# Patient Record
Sex: FEMALE | Race: WHITE | Employment: FULL TIME | URBAN - METROPOLITAN AREA
[De-identification: names, ages, dates, MRNs, and addresses within clinical notes are randomized per-mention and may not be internally consistent; named-entity substitution may affect disease eponyms.]

---

## 2019-09-25 ENCOUNTER — OFFICE VISIT (OUTPATIENT)
Dept: ORTHOPEDIC SURGERY | Age: 64
End: 2019-09-25

## 2019-09-25 VITALS
HEART RATE: 73 BPM | TEMPERATURE: 97.3 F | DIASTOLIC BLOOD PRESSURE: 62 MMHG | SYSTOLIC BLOOD PRESSURE: 137 MMHG | OXYGEN SATURATION: 100 % | BODY MASS INDEX: 27.25 KG/M2 | WEIGHT: 201.2 LBS | HEIGHT: 72 IN

## 2019-09-25 DIAGNOSIS — S16.1XXS STRAIN OF NECK MUSCLE, SEQUELA: ICD-10-CM

## 2019-09-25 DIAGNOSIS — M50.30 DDD (DEGENERATIVE DISC DISEASE), CERVICAL: ICD-10-CM

## 2019-09-25 DIAGNOSIS — M54.2 NECK PAIN: Primary | ICD-10-CM

## 2019-09-25 RX ORDER — METHYLPREDNISOLONE 4 MG/1
TABLET ORAL
Qty: 1 DOSE PACK | Refills: 0 | Status: SHIPPED | OUTPATIENT
Start: 2019-09-25

## 2019-09-25 RX ORDER — OMEPRAZOLE 20 MG/1
20 CAPSULE, DELAYED RELEASE ORAL DAILY
COMMUNITY

## 2019-09-25 RX ORDER — NAPROXEN 500 MG/1
500 TABLET ORAL 2 TIMES DAILY WITH MEALS
Qty: 30 TAB | Refills: 0 | Status: SHIPPED | OUTPATIENT
Start: 2019-09-25

## 2019-09-25 RX ORDER — BISMUTH SUBSALICYLATE 262 MG
1 TABLET,CHEWABLE ORAL DAILY
COMMUNITY

## 2019-09-25 RX ORDER — ERGOCALCIFEROL 1.25 MG/1
50000 CAPSULE ORAL
COMMUNITY

## 2019-09-25 RX ORDER — RANITIDINE 300 MG/1
TABLET ORAL 2 TIMES DAILY
COMMUNITY

## 2019-09-25 NOTE — PROGRESS NOTES
[facility]  [address], [address]  Phone: [phone]  Fax: [phone]        INITIAL CONSULTATION      HISTORY OF PRESENT ILLNESS:  Katerin Lagunas is a 59 y.o. female whom is referred from Dr. Okey Olszewski [facility]) secondary to right-sided neck pain following a fall at work on 4/25/019. She rates her pain 4-8/10. She denies radicular sxs at this time. Pain exacerbated when laying down. She report she fell on tile floor of the bathroom at work due to slipping on oil spill. Indicates she hit her neck on the bathroom stall. She reports she had acute onset of pain. Pt denies h/o cervical surgery or injections. Completed PT (6 sessions) in July with no benefit. She is performing her HEP x3/week. Pt completed MDP with some relief. Pt denies dropping things or loss of balance. Pt denies fever, weight loss, or skin changes. Dr. Okey Olszewski referred her, however I have no records. Cervical spine XR dated 5/1/19 films not available for my review. Per report, No acute pathology identified. Mild degenerative changes. The patient is RHD.  reviewed. Body mass index is 27.29 kg/m². PCP: Mer Cunningham MD    Past Medical History:   Diagnosis Date    Hoarseness of voice     Shortness of breath     at times    Wheezing    OTHE  Past Surgical History:   Procedure Laterality Date    HX HYSTERECTOMY      HX KNEE REPLACEMENT Bilateral     HX OTHER SURGICAL Left     foot surgery    HX OTHER SURGICAL Left     elbow    HX OTHER SURGICAL      rectal fissure    HX SHOULDER ARTHROSCOPY Right     IR ATHERECTOMY TIB/PER LT INIT     R SURGICAL Left     foot surgery    HX OTHER SURGICAL Left     elbow    HX OTHER SURGICAL      rectal fissure    HX SHOULDER ARTHROSCOPY Right     IR ATHERECTOMY TIB/PER LT INIT          Tobacco Use    Smoking status: Never Smoker    Smokeless tobacco: Never Used   Substance Use Topics    Alcohol use: No     Work status:  The patient is unknown. Marital status: The patient is unknown. No Known Allergies         Family History   Problem Relation Age of Onset    Hypertension Father          REVIEW OF SYSTEMS  Constitutional symptoms: Negative  Eyes: Negative  Ears, Nose, Throat, and Mouth: Negative  Cardiovascular: Negative  Respiratory: Negative  Genitourinary: Negative  Integumentary (Skin and/or breast): Negative  Musculoskeletal: Positive for neck pain  Extremities: Negative for edema. Endocrine/Rheumatologic: Negative  Hematologic/Lymphatic: Negative  Allergic/Immunologic: Negative  Psychiatric: Negative       PHYSICAL EXAMINATION  Visit Vitals  /62 (BP 1 Location: Left arm, BP Patient Position: Sitting)   Pulse 73   Temp 97.3 °F (36.3 °C) (Oral)   Ht 6' (1.829 m)   Wt 91.3 kg (201 lb 3.2 oz)   SpO2 100%   BMI 27.29 kg/m²       CONSTITUTIONAL: NAD, A&O x 3  HEART: Regular rate and rhythm  ABDOMEN: Positive bowel sounds, soft, nontender, and nondistended  LUNGS: Clear to auscultation bilaterally. SKIN: Negative for rash. RANGE OF MOTION: The patient has full passive range of motion in all four extremities. SENSATION: Sensation is intact to light touch throughout. MOTOR:   Straight Leg Raise: Negative, bilateral  Ron: Negative, bilateral  Tandem Gait: Neg. Deep tendon reflexes are 0 at the brachioradialis, 1+ at the biceps, and 0 at the triceps. Deep tendon reflexes are 2+ at the knees and 0 at the left ankle, 1+ at the right ankle. Shoulder AB/Flex Elbow Flex Wrist Ext Elbow Ext Wrist Flex Hand Intrin Tone   Right +4/5 +4/5 +4/5 +4/5 +4/5 +4/5 +4/5   Left +4/5 +4/5 +4/5 +4/5 +4/5 +4/5 +4/5              Hip Flex Knee Ext Knee Flex Ankle DF GTE Ankle PF Tone   Right +4/5 +4/5 +4/5 +4/5 +4/5 +4/5 +4/5   Left +4/5 +4/5 +4/5 +4/5 +4/5 +4/5 +4/5     RADIOGRAPHS  Preliminary reading of cervical plain films: Moderate disc space narrowing at C5-6 and C6-7. No acute pathology identified.        These are being sent out for official reading by Dr. Damian Garcia. ASSESSMENT   Diagnoses and all orders for this visit:    1. Neck pain  -     AMB POC XRAY, SPINE, CERVICAL; 2 OR 3  -     methylPREDNISolone (MEDROL DOSEPACK) 4 mg tablet; Per dose pack instructions  -     naproxen (NAPROSYN) 500 mg tablet; Take 1 Tab by mouth two (2) times daily (with meals). -     REFERRAL TO PHYSICAL THERAPY    2. Strain of neck muscle, sequela  -     methylPREDNISolone (MEDROL DOSEPACK) 4 mg tablet; Per dose pack instructions  -     naproxen (NAPROSYN) 500 mg tablet; Take 1 Tab by mouth two (2) times daily (with meals). -     REFERRAL TO PHYSICAL THERAPY    3. DDD (degenerative disc disease), cervical  -     methylPREDNISolone (MEDROL DOSEPACK) 4 mg tablet; Per dose pack instructions  -     naproxen (NAPROSYN) 500 mg tablet; Take 1 Tab by mouth two (2) times daily (with meals). -     REFERRAL TO PHYSICAL THERAPY           IMPRESSIONS/RECOMMENDATIONS:  Patient presents today with c/o right-sided neck pain following a fall at work on 4/25/019. I will have the patient sign a release of medical information to obtain notes from Dr. Sirisha Calderon. I will start her on Medrol Dosepak. I gave her a prescription for Naprosyn following completion of the Medrol Dosepak. I will refer her to physical therapy with an emphasis on HEP. Multiple treatment options were discussed. Patient is neurologically intact. I will see the patient back in 6 week's time or earlier if needed. Written by Angelita Harmon, as dictated by Mami Maldonado MD  I examined the patient, reviewed and agree with the note.

## 2019-12-20 ENCOUNTER — OFFICE VISIT (OUTPATIENT)
Dept: ORTHOPEDIC SURGERY | Age: 64
End: 2019-12-20

## 2019-12-20 VITALS
DIASTOLIC BLOOD PRESSURE: 67 MMHG | RESPIRATION RATE: 16 BRPM | BODY MASS INDEX: 27.09 KG/M2 | HEIGHT: 72 IN | HEART RATE: 92 BPM | SYSTOLIC BLOOD PRESSURE: 106 MMHG | OXYGEN SATURATION: 96 % | WEIGHT: 200 LBS

## 2019-12-20 DIAGNOSIS — S16.1XXS STRAIN OF NECK MUSCLE, SEQUELA: ICD-10-CM

## 2019-12-20 DIAGNOSIS — M50.30 DDD (DEGENERATIVE DISC DISEASE), CERVICAL: ICD-10-CM

## 2019-12-20 DIAGNOSIS — M54.2 NECK PAIN: Primary | ICD-10-CM

## 2019-12-20 NOTE — PROGRESS NOTES
[facility]  [address], [place]   Phone: [phone]  Fax: [phone]        PROGRESS NOTE      HISTORY OF PRESENT ILLNESS:  The patient is a 62 y.o. female and was seen today for follow up of right-sided neck pain following a fall at work on 4/25/019. She denies radicular sxs at this time. Pain exacerbated when laying down. She report she fell on tile floor of the bathroom at work due to slipping on oil spill. Indicates she hit her neck on the bathroom stall. She reports she had acute onset of pain. Pt denies h/o cervical surgery or injections. Completed PT (6 sessions) in July with no benefit. She is performing her HEP x3/week. Pt completed MDP with some relief. Pt denies dropping things or loss of balance. Pt denies fever, weight loss, or skin changes. Dr. Poppy Newton referred her, however I have no records. Cervical spine XR dated 5/1/19 films not available for my review. Per report, No acute pathology identified. Mild degenerative changes. Preliminary reading of cervical plain films: Moderate disc space narrowing at C5-6 and C6-7. No acute pathology identified. At last clinical appointment, patient presented with c/o right-sided neck pain following a fall at work on 4/25/019. I had the patient sign a release of medical information to obtain notes from Dr. Poppy Newton. I started her on Medrol Dosepak. I gave her a prescription for Naprosyn following completion of the Medrol Dosepak. I referred her to physical therapy with an emphasis on HEP. The patient returns today with pain location and distribution remain unchanged. Her pain is exacerbated with cervical extension. she rates her pain 2-8/10, previously 4-8/10. Patient completed PT 12/7/19 with some benefit. She did not tolerate traction. Pt notes they did not try dry needling in PT. She continues to maintain a daily HEP. She found temporary relief with a MDP. Pt denies dropping things or loss of balance.  reviewed. Body mass index is 27.12 kg/m².       PCP: Sirisha Anna MD      Past Medical History:   Diagnosis Date    Hoarseness of voice     Shortness of breath     at times    Wheezing         Social History     Socioeconomic History    Marital status: UNKNOWN     Spouse name: Not on file    Number of children: Not on file    Years of education: Not on file    Highest education level: Not on file   Occupational History    Not on file   Social Needs    Financial resource strain: Not on file    Food insecurity:     Worry: Not on file     Inability: Not on file    Transportation needs:     Medical: Not on file     Non-medical: Not on file   Tobacco Use    Smoking status: Never Smoker    Smokeless tobacco: Never Used   Substance and Sexual Activity    Alcohol use: No    Drug use: No    Sexual activity: Not on file   Lifestyle    Physical activity:     Days per week: Not on file     Minutes per session: Not on file    Stress: Not on file   Relationships    Social connections:     Talks on phone: Not on file     Gets together: Not on file     Attends Yazidism service: Not on file     Active member of club or organization: Not on file     Attends meetings of clubs or organizations: Not on file     Relationship status: Not on file    Intimate partner violence:     Fear of current or ex partner: Not on file     Emotionally abused: Not on file     Physically abused: Not on file     Forced sexual activity: Not on file   Other Topics Concern     Service Not Asked    Blood Transfusions Not Asked    Caffeine Concern Not Asked    Occupational Exposure Not Asked   Colie Maizes Hazards Not Asked    Sleep Concern Not Asked    Stress Concern Not Asked    Weight Concern Not Asked    Special Diet Not Asked    Back Care Not Asked    Exercise Not Asked    Bike Helmet Not Asked   [address] Not Asked    Self-Exams Not Asked   Social History Narrative    Not on file       Current Outpatient Medications   Medication Sig Dispense Refill    multivitamin (ONE A DAY) tablet Take 1 Tab by mouth daily.  omeprazole (PRILOSEC) 20 mg capsule Take 20 mg by mouth daily.  ergocalciferol (VITAMIN D2) 50,000 unit capsule Take 50,000 Units by mouth.  raNITIdine (ZANTAC) 300 mg tab Take  by mouth two (2) times a day.  methylPREDNISolone (MEDROL DOSEPACK) 4 mg tablet Per dose pack instructions 1 Dose Pack 0    naproxen (NAPROSYN) 500 mg tablet Take 1 Tab by mouth two (2) times daily (with meals). 30 Tab 0       No Known Allergies       PHYSICAL EXAMINATION    Visit Vitals  /67   Pulse 92   Resp 16   Ht 6' (1.829 m)   Wt 200 lb (90.7 kg)   SpO2 96%   BMI 27.12 kg/m²       CONSTITUTIONAL: NAD, A&O x 3  SENSATION: Intact to light touch throughout  NEURO: Chris's is negative bilaterally. RANGE OF MOTION: The patient has full passive range of motion in all four extremities. Shoulder AB/Flex Elbow Flex Wrist Ext Elbow Ext Wrist Flex Hand Intrin Tone   Right +4/5 +4/5 +4/5 +4/5 +4/5 +4/5 +4/5   Left +4/5 +4/5 +4/5 +4/5 +4/5 +4/5 +4/5                 ASSESSMENT   Diagnoses and all orders for this visit:    1. Neck pain    2. Strain of neck muscle, sequela    3. DDD (degenerative disc disease), cervical          IMPRESSION AND PLAN:  Given her continued pain despite conservative treatments, I will refer for a cervical spine MRI. I advised patient to bring copies of films to next visit. Patient is not interested in surgical intervention at this time. I recommend she continue to perfume her HEP Patient is neurologically intact. I will see the patient back following MRI or earlier if needed. Written by Ella Qiu, as dictated by Robin Shah MD  I examined the patient, reviewed and agree with the note.

## 2019-12-20 NOTE — LETTER
12/20/19 Patient: Stefano Wolf YOB: 1955 Date of Visit: 12/20/2019 Marie Carreon MD 
[address] [facility] [address] [place] [address] VIA Facsimile: [phone] Dear Marie Carreon MD, Thank you for referring Ms. Stefano Wolf to [address] for evaluation. My notes for this consultation are attached. If you have questions, please do not hesitate to call me. I look forward to following your patient along with you. Sincerely, Sharee Mayberry MD

## 2019-12-26 ENCOUNTER — TELEPHONE (OUTPATIENT)
Dept: ORTHOPEDIC SURGERY | Age: 64
End: 2019-12-26

## 2019-12-26 NOTE — TELEPHONE ENCOUNTER
[employer], Kyara Walton, is trying to see if [facility] paperwork has been completed to schedule her MRI. She said she has a contact \"Adan\" for our office, but I am unsure who would be working on this or even if anyone is in today to help her. Please advise.   [phone]

## 2019-12-30 ENCOUNTER — DOCUMENTATION ONLY (OUTPATIENT)
Dept: ORTHOPEDIC SURGERY | Age: 64
End: 2019-12-30

## 2019-12-30 NOTE — TELEPHONE ENCOUNTER
Order and office notes have been faxed to the W/C adjustor, Veronica Castillo with [address] [place], requesting authorization for MRI C-Spine, these are PENDING approval.

## 2019-12-30 NOTE — PROGRESS NOTES
Order and office notes have been faxed to the W/C adjustor, Farhan Collazo with [address] [place], requesting authorization for MRI C-Spine, these are PENDING approval.

## 2020-01-14 ENCOUNTER — DOCUMENTATION ONLY (OUTPATIENT)
Dept: ORTHOPEDIC SURGERY | Age: 65
End: 2020-01-14

## 2020-02-06 NOTE — PROGRESS NOTES
[facility]  [address], [place]   Phone: [phone]  Fax: [phone]        PROGRESS NOTE      HISTORY OF PRESENT ILLNESS:  The patient is a 62 y.o. female and was seen today for follow up of right-sided neck pain following a fall at work on 4/25/019. She denies radicular sxs at this time. Pain exacerbated when laying down and cervical extension. She report she fell on tile floor of the bathroom at work due to slipping on oil spill. Indicates she hit her neck on the bathroom stall. She reports she had acute onset of pain. Pt denies h/o cervical surgery or injections. Patient completed PT 12/7/19 without dry needling noting some benefit. She did not tolerate traction. She continues to maintain a daily HEP. She found temporary relief with a MDP. Pt denies dropping things or loss of balance. Pt denies fever, weight loss, or skin changes. Dr. Saman Moise referred her, however I have no records. Cervical spine XR dated 5/1/19 films not available for my review. Per report, No acute pathology identified. Mild degenerative changes. Preliminary reading of cervical plain films: Moderate disc space narrowing at C5-6 and C6-7. No acute pathology identified.  At last clinical appointment, patient presented with c/o right-sided neck pain following a fall at work on 4/25/019. I had the patient sign a release of medical information to obtain notes from Dr. Saman Moise. I started her on Medrol Dosepak. I gave her a prescription for Naprosyn following completion of the Medrol Dosepak. I referred her to physical therapy with an emphasis on HEP.      The patient returns today with c/o right sided neck pain and more recent paraesthesia in the 2nd and 3rd digits and the bicep of the RUE. She rates her pain 2-10/10, previously 2-8/10. She describes an electric shock of pain with certain movement. Pt denies dropping things or loss of balance. She denies any recent falls.  Pt reports her worst pain is at night with vivid dreams and headaches. Patient denies current use of antidepressants or anticoagulants. She is compliant with her HEP. C Spine MRI dated 2/7/2020 reviewed. Per report, multilevel degenerative disc and degeenerative facet disease. Central canal stenosis is mild. However, there is severe bilateral foraminal stenosis at C4-5 and C5-6 with moderate to severe right foraminal encroachment at C3-4.  reviewed. Body mass index is 27.56 kg/m².       PCP: Alka Lyman MD      Past Medical History:   Diagnosis Date    Hoarseness of voice     Shortness of breath     at times    Wheezing         Social History     Socioeconomic History    Marital status:      Spouse name: Not on file    Number of children: Not on file    Years of education: Not on file    Highest education level: Not on file   Occupational History    Not on file   Social Needs    Financial resource strain: Not on file    Food insecurity:     Worry: Not on file     Inability: Not on file    Transportation needs:     Medical: Not on file     Non-medical: Not on file   Tobacco Use    Smoking status: Never Smoker    Smokeless tobacco: Never Used   Substance and Sexual Activity    Alcohol use: No    Drug use: No    Sexual activity: Not on file   Lifestyle    Physical activity:     Days per week: Not on file     Minutes per session: Not on file    Stress: Not on file   Relationships    Social connections:     Talks on phone: Not on file     Gets together: Not on file     Attends Latter day service: Not on file     Active member of club or organization: Not on file     Attends meetings of clubs or organizations: Not on file     Relationship status: Not on file    Intimate partner violence:     Fear of current or ex partner: Not on file     Emotionally abused: Not on file     Physically abused: Not on file     Forced sexual activity: Not on file   Other Topics Concern   [address] Service Not Asked  Blood Transfusions Not Asked    Caffeine Concern Not Asked    Occupational Exposure Not Asked    Hobby Hazards Not Asked    Sleep Concern Not Asked    Stress Concern Not Asked    Weight Concern Not Asked    Special Diet Not Asked    Back Care Not Asked    Exercise Not Asked    Bike Helmet Not Asked   [address] Not Asked    Self-Exams Not Asked   Social History Narrative    Not on file       Current Outpatient Medications   Medication Sig Dispense Refill    multivitamin (ONE A DAY) tablet Take 1 Tab by mouth daily.  omeprazole (PRILOSEC) 20 mg capsule Take 20 mg by mouth daily.  ergocalciferol (VITAMIN D2) 50,000 unit capsule Take 50,000 Units by mouth.  raNITIdine (ZANTAC) 300 mg tab Take  by mouth two (2) times a day.  naproxen (NAPROSYN) 500 mg tablet Take 1 Tab by mouth two (2) times daily (with meals). 30 Tab 0    methylPREDNISolone (MEDROL DOSEPACK) 4 mg tablet Per dose pack instructions 1 Dose Pack 0       No Known Allergies       PHYSICAL EXAMINATION    Visit Vitals  /76 (BP 1 Location: Left arm, BP Patient Position: Sitting)   Pulse 74   Temp 97.8 °F (36.6 °C) (Oral)   Resp 16   Ht 6' (1.829 m)   Wt 203 lb 3.2 oz (92.2 kg)   SpO2 99%   BMI 27.56 kg/m²       CONSTITUTIONAL: NAD, A&O x 3  SENSATION: Intact to light touch throughout  NEURO: Chris's is negative bilaterally. RANGE OF MOTION: The patient has full passive range of motion in all four extremities. MOTOR:     Shoulder AB/Flex Elbow Flex Wrist Ext Elbow Ext Wrist Flex Hand Intrin Tone   Right +4/5 +4/5 +4/5 +4/5 +4/5 +4/5 +4/5   Left +4/5 +4/5 +4/5 +4/5 +4/5 +4/5 +4/5     ASSESSMENT   Diagnoses and all orders for this visit:    1. Cervical spondylosis without myelopathy    2. Cervical neuritis    3.  DDD (degenerative disc disease), cervical          IMPRESSION AND PLAN:  Patient returns to the office today with c/o right-sided neck pain and more recent paraesthesia in the 2nd and 3rd digits and the bicep of the RUE. Multiple treatment options were discussed. I will try her on Cymbalta 30 mg qhs. The risks, benefits, and potential side effects of this medication were discussed. Patient understands and wishes to proceed. Patient advised to call the office if intolerant to new medication. I encouraged the patient to continue her daily HEP. Patient is neurologically intact. I will see the patient back in 1 month's time or earlier if needed. Written by Leslie Barbosa, as dictated by Sarah Strong MD  I examined the patient, reviewed and agree with the note.

## 2020-02-12 ENCOUNTER — OFFICE VISIT (OUTPATIENT)
Dept: ORTHOPEDIC SURGERY | Age: 65
End: 2020-02-12

## 2020-02-12 VITALS
HEART RATE: 74 BPM | OXYGEN SATURATION: 99 % | WEIGHT: 203.2 LBS | BODY MASS INDEX: 27.52 KG/M2 | HEIGHT: 72 IN | DIASTOLIC BLOOD PRESSURE: 76 MMHG | TEMPERATURE: 97.8 F | SYSTOLIC BLOOD PRESSURE: 112 MMHG | RESPIRATION RATE: 16 BRPM

## 2020-02-12 DIAGNOSIS — M50.30 DDD (DEGENERATIVE DISC DISEASE), CERVICAL: ICD-10-CM

## 2020-02-12 DIAGNOSIS — M54.12 CERVICAL NEURITIS: ICD-10-CM

## 2020-02-12 DIAGNOSIS — M47.812 CERVICAL SPONDYLOSIS WITHOUT MYELOPATHY: Primary | ICD-10-CM

## 2020-02-12 RX ORDER — DULOXETIN HYDROCHLORIDE 30 MG/1
30 CAPSULE, DELAYED RELEASE ORAL DAILY
Qty: 30 CAP | Refills: 1 | Status: SHIPPED | OUTPATIENT
Start: 2020-02-12

## 2020-02-12 NOTE — LETTER
2/12/20 Patient: Tasneem Frost YOB: 1955 Date of Visit: 2/12/2020 Raj Sprague MD 
[address] [facility] 100 [address] [place] [id number] VIA Facsimile: [phone] Dear Raj Sprague MD, Thank you for referring Ms. Mahin Mora to [address] for evaluation. My notes for this consultation are attached. If you have questions, please do not hesitate to call me. I look forward to following your patient along with you. Sincerely, Amanda Cohen MD

## 2020-02-28 DIAGNOSIS — M54.2 NECK PAIN: ICD-10-CM

## 2020-02-28 DIAGNOSIS — S16.1XXS STRAIN OF NECK MUSCLE, SEQUELA: ICD-10-CM

## 2020-02-28 DIAGNOSIS — M50.30 DDD (DEGENERATIVE DISC DISEASE), CERVICAL: ICD-10-CM

## 2020-05-26 ENCOUNTER — VIRTUAL VISIT (OUTPATIENT)
Dept: ORTHOPEDIC SURGERY | Age: 65
End: 2020-05-26

## 2020-05-26 DIAGNOSIS — M47.812 CERVICAL SPONDYLOSIS WITHOUT MYELOPATHY: Primary | ICD-10-CM

## 2020-05-26 DIAGNOSIS — M54.12 CERVICAL NEURITIS: ICD-10-CM

## 2020-05-26 DIAGNOSIS — M50.30 DDD (DEGENERATIVE DISC DISEASE), CERVICAL: ICD-10-CM

## 2020-05-26 RX ORDER — UREA 10 %
LOTION (ML) TOPICAL
COMMUNITY

## 2020-05-26 NOTE — PROGRESS NOTES
[facility] SPECIALISTS  [address], Dor[place] Reeves   Phone: [phone]  Fax: [phone]        PROGRESS NOTE    CONSENT:  Pursuant to the emergency declaration under the [address] and [facility] 1135 waiver authority and the [employer] and [employer] Act, this Virtual Visit was conducted, with patient's consent, to reduce the patient's risk of exposure to COVID-19 and provide continuity of care for an established patient. Services were provided through a video synchronous discussion virtually to substitute for in-person appointment. ENCOUNTER (minutes): 15    HISTORY OF PRESENT ILLNESS:  The patient is a 62 y.o. female and is being seen via Doxy. Me TeleVisit at the [facility] office for follow up of right-sided neck pain following a fall at work on 4/25/19 and more recent symptoms radiating into the RUE with paraesthesias of the bicep and digits 2-3. Additionally, she endorses vivid dreams, HA, and describes an electric shock of pain with certain movements. She reports she fell on tile floor of the bathroom at work due to slipping on oil spill. Indicates she hit her neck on the bathroom stall. She reports she had acute onset of pain. Her pain is exacerbated when laying down and cervical extension. She reports her worst pain is at night. Pt denies h/o cervical surgery or injections. Patient completed PT 12/7/19 without dry needling noting some benefit. She did not tolerate traction. She continues to maintain a daily HEP. She found temporary relief with a MDP. Pt denies dropping things or loss of balance. Pt denies fever, weight loss, or skin changes. Dr. Sydnee Boggs referred her, however I have no records. Preliminary reading of cervical plain films dated 9/25/19: Moderate disc space narrowing at C5-6 and C6-7. No acute pathology identified. C Spine MRI dated 2/7/2020 reviewed.  Per report, multilevel degenerative disc and degeenerative facet disease. Central canal stenosis is mild. However, there is severe bilateral foraminal stenosis at C4-5 and C5-6 with moderate to severe right foraminal encroachment at C3-4.  At her last clinical appointment, I tried her on Cymbalta 30 mg qhs. I encouraged the patient to continue her daily HEP.    At today's video consultation, the patient reports her pain location and distribution remains unchanged. She rates her pain 4-9/10, previously 5/10. Patient reports she never started the Cymbalta 30 mg daily secondary to hearing poor reviews from her coworkers. She previously completed PT with traction. She is compliant with her HEP. Pt denies dropping things or loss of balance. Pt denies any signs of weakness.  reviewed. There is no height or weight on file to calculate BMI.     PCP: Rico Contreras MD      Past Medical History:   Diagnosis Date    Hoarseness of voice     Shortness of breath     at times    Wheezing         Social History     Socioeconomic History    Marital status:      Spouse name: Not on file    Number of children: Not on file    Years of education: Not on file    Highest education level: Not on file   Occupational History    Not on file   Social Needs    Financial resource strain: Not on file    Food insecurity     Worry: Not on file     Inability: Not on file    Transportation needs     Medical: Not on file     Non-medical: Not on file   Tobacco Use    Smoking status: Never Smoker    Smokeless tobacco: Never Used   Substance and Sexual Activity    Alcohol use: No    Drug use: No    Sexual activity: Not on file   Lifestyle    Physical activity     Days per week: Not on file     Minutes per session: Not on file    Stress: Not on file   Relationships    Social connections     Talks on phone: Not on file     Gets together: Not on file     Attends Hindu service: Not on file     Active member of club or organization: Not on file Attends meetings of clubs or organizations: Not on file     Relationship status: Not on file    Intimate partner violence     Fear of current or ex partner: Not on file     Emotionally abused: Not on file     Physically abused: Not on file     Forced sexual activity: Not on file   Other Topics Concern     Service Not Asked    Blood Transfusions Not Asked    Caffeine Concern Not Asked    Occupational Exposure Not Asked   Lucy [employer] Hazards Not Asked    Sleep Concern Not Asked    Stress Concern Not Asked    Weight Concern Not Asked    Special Diet Not Asked    Back Care Not Asked    Exercise Not Asked    Bike Helmet Not Asked   [address] Not Asked    Self-Exams Not Asked   Social History Narrative    Not on file       Current Outpatient Medications   Medication Sig Dispense Refill    multivitamin (ONE A DAY) tablet Take 1 Tab by mouth daily.  omeprazole (PRILOSEC) 20 mg capsule Take 20 mg by mouth daily.  ergocalciferol (VITAMIN D2) 50,000 unit capsule Take 50,000 Units by mouth.  cyanocobalamin (Vitamin B-12) 100 mcg tablet Vitamin B12      DULoxetine (CYMBALTA) 30 mg capsule Take 1 Cap by mouth daily. 30 Cap 1    raNITIdine (ZANTAC) 300 mg tab Take  by mouth two (2) times a day.  methylPREDNISolone (MEDROL DOSEPACK) 4 mg tablet Per dose pack instructions 1 Dose Pack 0    naproxen (NAPROSYN) 500 mg tablet Take 1 Tab by mouth two (2) times daily (with meals). 30 Tab 0       No Known Allergies       PHYSICAL EXAMINATION  Unable to perform examination secondary to COVID-19. CONSTITUTIONAL: NAD, A&O x 3    ASSESSMENT   Diagnoses and all orders for this visit:    1. Cervical spondylosis without myelopathy    2. Cervical neuritis    3. DDD (degenerative disc disease), cervical      IMPRESSION AND PLAN:  The patient consented to the tele health visit and was aware that there would be a charge. During today's Doxy. Me TeleVisit patient had c/o  right-sided neck pain following a fall at work. Multiple treatment options were discussed. I offered to try her on another neuropathic pain medication, pt declined. She was not interested in surgical intervention at this time. She has been instructed to call the office if she begins to show signs of neurological impairment. I encouraged her to continue to perform her daily HEP. I will see the patient back as needed. Written by Yue Rios, as dictated by Angelo Johnston MD  I examined the patient, reviewed and agree with the note.

## 2022-10-18 ENCOUNTER — HOSPITAL ENCOUNTER (OUTPATIENT)
Dept: PHYSICAL THERAPY | Age: 67
Discharge: HOME | End: 2022-10-18
Payer: MEDICARE

## 2022-10-18 PROCEDURE — 97530 THERAPEUTIC ACTIVITIES: CPT

## 2022-10-18 PROCEDURE — 97162 PT EVAL MOD COMPLEX 30 MIN: CPT

## 2022-10-18 NOTE — THERAPY EVALUATION
PT DAILY TREATMENT NOTE/HIP [id number]    Patient Name: Emeka Neal  Date:10/18/2022  : 1955  [x]  Patient  Verified  Payor: Octaviano Marin / Plan: [place] MEDICARE PART A & B / Product Type: Medicare /    In time: 0900  Out time: 0945  Total Treatment Time (min): 45  Visit #: 1    Medicare/BCBS Only   Total Timed Codes (min):  15 1:1 Treatment Time:  45     Treatment Area: Pain in left hip [M25.552]    SUBJECTIVE  Pain Level (0-10 scale):  Current:  5/10; At worst: 10/10  [x]Sharp  []Dull  []Achy []Burning []Throbbing []N&T []Other:  [x]constant []intermittent []improving []worsening []no change since onset  Incr sx with: walking, sudden movements, lifting, picking up objects, putting on pants, getting in shower   Decr sx with: Rest, medication, heat,     Any medication changes, allergies to medications, adverse drug reactions, diagnosis change, or new procedure performed?: [x] No    [] Yes (see summary sheet for update)    Subjective:   Pt is a 74 y/o female who presents to physical therapy with L hip pain of 4-6 weeks secondary to a fall. Pt received in waiting area ambulating with no AD and slight antalgic gait pattern of decreased R step length and decreased gait speed. Pt reports pain begins in L posterolateral hip and descends to groin and lateral thigh. Pt presents to PT with decreased ROM, decreased strength, increased pain, and decreased ability to ambulate with normalized gait pattern. Pt could benefit from skilled PT services to address the above impairments and to improve Pt ability to participate in functional activities of choice.          Patient Goals: Reduce pain, restore mobility  Previous Treatment/Compliance: PT for B TKAs  Barriers: [x]pain []financial []time []transportation []other  Motivation: good  Substance use: []Alcohol []Tobacco []other:   LEFS Score: 0.5  FOTO: 1      PMH:   Past Medical History:   Diagnosis Date    Hoarseness of voice     Shortness of breath     at times Wheezing         OBJECTIVE/EXAMINATION  Posture: WFL    Gait:  [] Normal    [x] Abnormal    [] Antalgic    [] NWB    Device:    Describe: Decreased gait speed, decreased R step length         ROM/Strength        AROM                     PROM        Strength (1-5)  Hip Left Right Left Right Left Right   Flexion 55 WNL 55  3-/5 4/5   Extension WNL WNL   4+/5 5/5   Abduction WNL WNL   4/5 4+/5   Adduction WNL WNL   4/5 4+/5   ER WNL WNL   4/5 4+/5   IR WNL WNL   4+/5 5/5   Knee Left Right Left Right Left Right   Extension WNL WNL   4/5 5/5   Flexion WNL WNL   4/5 4+/5        Flexibility: [] Unable to assess at this time  Hip Flexor:  (L) Tightness= [] WNL   [] Min   [] Mod   [] Severe    (R) Tightness= [] WNL   [] Min   [] Mod   [] Severe  Hamstrings:    (L) Tightness= [] WNL   [x] Min   [] Mod   [] Severe    (R) Tightness= [] WNL   [] Min   [] Mod   [] Severe  Quadriceps:    (L) Tightness= [] WNL   [x] Min   [] Mod   [] Severe    (R) Tightness= [] WNL   [] Min   [] Mod   [] Severe  Gastroc:    (L) Tightness= [] WNL   [] Min   [] Mod   [] Severe    (R) Tightness= [] WNL   [] Min   [] Mod   [] Severe                                  Palpation  [] Min  [] Mod  [] Severe    Location:  [] Min  [] Mod  [] Severe    Location:  [] Min  [] Mod  [] Severe    Location:    Optional Tests  George/ Frankie Test: [] Neg    [] Pos  Ignaico Test:  [] Neg    [] Pos  Scouring Test: [] Neg    [] Pos  Trendelenberg:  [] Neg    [] Pos [] Left    [] Right  OberTest:   [] Neg    [] Pos  Ely's Test:  [] Neg    [] Pos  Piriformis Test:  [] Neg    [] Pos  Sub-talor alignment: [] Neurtral [] Pronation [] Supination  Forefoot alignment: [] Neutral [] Varus [] Valgus  Functional Leg Length (cm):   Right:  Left:  Discrepancy:    Other tests/ comments:    Mobility: independent  Self Care: independent      Modality rationale:    Min Type Additional Details    [] Estim:  []Unatt       []IFC  []Premod                        []Other:  []w/ice []w/heat  Position:  Location:    [] Estim: []Att    []TENS instruct  []NMES                    []Other:  []w/US   []w/ice   []w/heat  Position:  Location:    []  Traction: [] Cervical       []Lumbar                       [] Prone          []Supine                       []Intermittent   []Continuous Lbs:  [] before manual  [] after manual    []  Ultrasound: []Continuous   [] Pulsed                           []1MHz   []3MHz Location:  W/cm2:    []  Iontophoresis with dexamethasone         Location: [] Take home patch   [] In clinic    []  Ice     []  heat  []  Ice massage  []  Laser   []  Anodyne Position:  Location:    []  Laser with stim  []  Other: Position:  Location:    []  Vasopneumatic Device Pressure:       [] lo [] med [] hi   Temperature: [] lo [] med [] hi   [] Skin assessment post-treatment:  []intact []redness- no adverse reaction    []redness - adverse reaction:     30 min [x]Eval                  []Re-Eval     15 min Therapeutic Activity:  [x]  See flow sheet :   Rationale: increase ROM and increase strength  to improve the patients ability to ambulate          With   [] TE   [] TA   [] neuro   [] other: Patient Education: [x] Review HEP    [] Progressed/Changed HEP based on:   [] positioning   [] body mechanics   [] transfers   [] heat/ice application    [] other:      Pain Level (0-10 scale) post treatment: 5/10      ASSESSMENT:       [x]  See Plan of Care  []  See progress note/recertification  []  See Discharge Summary           Lara Sheppard PT, DPT  10/18/2022  9:17 AM

## 2022-10-21 ENCOUNTER — HOSPITAL ENCOUNTER (OUTPATIENT)
Dept: PHYSICAL THERAPY | Age: 67
Discharge: HOME | End: 2022-10-21
Payer: MEDICARE

## 2022-10-21 PROCEDURE — 97110 THERAPEUTIC EXERCISES: CPT

## 2022-10-21 NOTE — PROGRESS NOTES
PT DAILY TREATMENT NOTE     Patient Name: Michael Koch  Date:10/21/2022  : 1955  [x]  Patient  Verified  Payor: [facility] MEDICARE / Plan: [place] MEDICARE PART A & B / Product Type: Medicare /    In time:0900  Out time:959  Total Treatment Time (min): 59  Total Timed Codes (min): 49  1:1 Treatment Time (min): 49   Visit #: 2     Treatment Area: Pain in left hip [M25.552]    SUBJECTIVE  Pt says, \"my hip only hurts if it's in a certain angle. \"    Pain Level (0-10 scale): 6    Any medication changes, allergies to medications, adverse drug reactions, diagnosis change, or new procedure performed?: [x] No    [] Yes (see summary sheet for update)    OBJECTIVE  Modality rationale: decrease pain and increase tissue extensibility to improve the patients ability to fully participate in rehab. Min Type Additional Details    [] Estim: []Att   []Unatt  []TENS instruct                 []IFC  []Premod []NMES                       []Other:  []w/US   []w/ice   []w/heat  Position:  Location:    []  Traction: [] Cervical       []Lumbar                       [] Prone          []Supine                       []Intermittent   []Continuous Lbs:  [] before manual  [] after manual    []  Ultrasound: []Continuous   [] Pulsed                           []1MHz   []3MHz Location:  W/cm2:   10 []  Ice     [x]  heat  []  Ice massage Position: seated  Location: L thigh    []  Vasopneumatic Device Pressure: [] lo [] med [] hi   Temp: [] lo [] med [] hi   [] Skin assessment post-treatment:  []intact []redness- no adverse reaction       []redness - adverse reaction:     45 min Therapeutic Exercise:  [x] See flow sheet :   Rationale: increase ROM, increase strength, improve coordination, improve balance, and increase proprioception to improve the patients ability to ambulate pain free.             With TE  TA   NR  GT   Misc Patient Education: [x] Review HEP    [] Progressed/Changed HEP based on:   [] positioning   [] body mechanics   [] transfers   [] heat/ice application        Pain Level (0-10 scale) post treatment: 7    ASSESSMENT/Changes in Function: Initiated POC working to increase L hip strength, mobility, range of motion, and flexibility. Session began with MHP to decrease reported tightness and soreness. Recumbent bicycle followed with no adverse effects. Reviewed HEP correcting form and aaron as necessary. Introduced lateral walks and mini squats working to increase lateral strength and stability. Will continue POC progressing as able. Patient will continue to benefit from skilled PT services to modify and progress therapeutic interventions, address functional mobility deficits, address ROM deficits, address strength deficits, analyze and address soft tissue restrictions, analyze and cue movement patterns, analyze and modify body mechanics/ergonomics, assess and modify postural abnormalities, and address imbalance/dizziness to attain remaining goals.      [x]  See Plan of Care  []  See progress note/recertification  []  See Discharge Summary         PLAN  []  Upgrade activities as tolerated     [x]  Continue plan of care  []  Update interventions per flow sheet       []  Discharge due to:_  []  Other:    Patricia Mehta  10/21/2022  1:02 PM

## 2022-10-21 NOTE — THERAPY EVALUATION
[address] [facility] Rag[address] Armendariz, [address] [facility], [address] [place] [address]  PLAN OF CARE / STATEMENT OF MEDICAL NECESSITY FOR PHYSICAL THERAPY SERVICES  Patient Name: Leigha Search : 1955   Medical   Diagnosis: Pain in left hip [M25.552] Treatment Diagnosis: R26.2; M62.81   Onset Date: ~22     [facility]   Prior Hospitalization: See medical history Provider #: [id number]   Prior Level of Function: independent   Comorbidities: SOB, wheezing   Medications: Verified on Patient Summary List   The Plan of Care and following information is based on the information from the initial evaluation.   ==========================================================================================  Assessment / Functional Analysis:    Pt is a 74 y/o female who presents to physical therapy with L hip pain of 4-6 weeks secondary to a fall. Pt received in waiting area ambulating with no AD and slight antalgic gait pattern of decreased R step length and decreased gait speed. Pt reports pain begins in L posterolateral hip and descends to groin and lateral thigh. Pt presents to PT with decreased ROM, decreased strength, increased pain, and decreased ability to ambulate with normalized gait pattern.  Pt could benefit from skilled PT services to address the above impairments and to improve Pt ability to participate in functional activities of choice.     ==========================================================================================  Eval Complexity: History: MEDIUM  Complexity : 1-2 comorbidities / personal factors will impact the outcome/ POC Exam:LOW Complexity : 1-2 Standardized tests and measures addressing body structure, function, activity limitation and / or participation in recreation  Presentation: LOW Complexity : Stable, uncomplicated  Clinical Decision Making:MEDIUM Complexity : FOTO score of 26-74Overall Complexity:MEDIUM    Problem List: pain affecting function, decrease ROM, decrease strength, impaired gait/ balance, decrease ADL/ functional abilitiies, decrease activity tolerance, decrease flexibility/ joint mobility, and decrease transfer abilities   Treatment Plan may include any combination of the following: Therapeutic exercise, Neuromuscular reeducation, Manual therapy, Therapeutic activity, Self care/home management, Electric stim unattended , Vasopneumatic device, and Gait training  Patient / Family readiness to learn indicated by: asking questions, trying to perform skills, and interest  Persons(s) to be included in education: patient (P)  Barriers to Learning/Limitations: None      Patient self reported health status: good  Rehabilitation Potential: good    Objective Measures:  LEFS Score: 0.5  FOTO: 1    ROM/Strength                   AROM                          PROM                       Strength (1-5)  Hip Left Right Left Right Left Right   Flexion 55 WNL 55   3-/5 4/5   Extension WNL WNL     4+/5 5/5   Abduction WNL WNL     4/5 4+/5   Adduction WNL WNL     4/5 4+/5   ER WNL WNL     4/5 4+/5   IR WNL WNL     4+/5 5/5   Knee Left Right Left Right Left Right   Extension WNL WNL     4/5 5/5   Flexion WNL WNL     4/5 4+/5        Short Term Goals: 3 weeks  Patient will report the knowledge of 3 exercises that can be used to help reduce symptoms to be able to ind reduce symptoms while at home. Patient will demonstrate a 1/2 grade improvement in LLE MMT to be able to better ambulate with a normalized gait without pain. Patient will demonstrate L hip PROM of 0-100 deg to facilitate increased ability to ascend/descend stairs. Patient will increase LEFS > 10 points to facilitate increased ability to perform functional activities of choice. Long Term Goals: 6 weeks  Patient will demonstrate a 2 grade improvement in LLE MMT to be able to better ambulate with a normalized gait without pain.   Patient will demonstrate L hip AROM of 0-100 deg or greater to be able to return to normal ambulation on level and unlevel surfaces. Patient will demonstrate the ability to ambulate > 500 feet without an AD without evidence of antalgia to be able to return to an ind walking program following discharge. Patient will increase LEFS > 20 points to facilitate increased ability to perform leisure activities of choice. Frequency / Duration: Patient to be seen  2  times per week for 6  weeks:  Patient / Caregiver education and instruction: self care, activity modification, and exercises    Therapist Signature: Reynaldo Pfeiffer PT, DPT Date: 50/34/1470   Certification Period: 10/18/22 - 1/18/23 Time: 8:28 AM   ===========================================================================================  I certify that the above Physical Therapy Services are being furnished while the patient is under my care. I agree with the treatment plan and certify that this therapy is necessary. Physician Signature:        Date:       Time:     Please sign and return to [address] [place] [address] PT or you may fax the signed copy to [phone]. Please call [phone] if more information required. Thank you.

## 2022-10-26 ENCOUNTER — APPOINTMENT (OUTPATIENT)
Dept: PHYSICAL THERAPY | Age: 67
End: 2022-10-26
Payer: MEDICARE

## 2023-03-01 ENCOUNTER — HOSPITAL ENCOUNTER (OUTPATIENT)
Age: 68
Setting detail: RECURRING SERIES
Discharge: HOME | End: 2023-03-04
Payer: MEDICARE

## 2023-03-01 PROCEDURE — 97162 PT EVAL MOD COMPLEX 30 MIN: CPT

## 2023-03-01 PROCEDURE — 97530 THERAPEUTIC ACTIVITIES: CPT

## 2023-03-01 NOTE — THERAPY EVALUATION
PT DAILY TREATMENT NOTE/HIP [id number]    Patient Name: Brittaney Reardon  Date:3/1/2023  : 1955  [x]  Patient  Verified  Payor: MEDICARE / Plan: MEDICARE PART A AND B / Product Type: *No Product type* /    In time: 1300  Out time: 1345  Total Treatment Time (min): 45  Visit #: 1    Medicare/BCBS Only   Total Timed Codes (min):  15 1:1 Treatment Time:  45     Treatment Area: L Total Hip Arthroplasty    SUBJECTIVE  Pain Level (0-10 scale): Current: 4/10; At worst: 8/10; At best: 2/10  []Sharp  [x]Dull  [x]Achy [x]Burning []Throbbing []N&T []Other:  []constant []intermittent []improving []worsening []no change since onset  Decr sx with: movement and medication  Incr sx with: standing after sitting for a while, steps    Any medication changes, allergies to medications, adverse drug reactions, diagnosis change, or new procedure performed?: [x] No    [] Yes (see summary sheet for update)    Subjective:   Pt is a 79 y/o female who presents to physical therapy s/p L LEON on 23. Pt received in waiting area ambulating with 2WW exhibiting an antalgic gait pattern of decreased R step length, decreased gait speed, and decreased jodie. Prior to surgery pt used no AD for ambulation. Pt presents today with decreased ROM, decreased strength, increased pain, and decreased ability to ambulate with normalized gait pattern. Pt could benefit from skilled PT services to address the above impairments and to improve Pt ability to participate in functional activities of choice.      Date of Surgery: 23  Weight bearing status: WBAT    Patient Goals: Reduce pain, restore mobility  Previous Treatment/Compliance: NA  Barriers: []pain []financial []time []transportation []other  Motivation: Good  Substance use: []Alcohol []Tobacco []other:   LEFS Score: 18.7  FOTO: 35    Past Medical History:   Diagnosis Date    Hoarseness of voice     Shortness of breath     at times    Wheezing OBJECTIVE/EXAMINATION  Posture:    Gait:  [] Normal    [] Abnormal    [x] Antalgic    [] NWB    Device: 2WW    Describe: decreased R step length, decreased gait speed, and decreased jodie         ROM/Strength        AROM                     PROM        Strength (1-5)  Hip Left Right Left Right Left Right   Flexion 90 105 92  3+/5 4/5   Extension     4/5 4+/5   Abduction     4-/5 4+/5   Adduction     4-/5 4+/5   ER     3+/5 4/5   IR     4/5 4+/5   Knee Left Right Left Right Left Right   Extension     4/5 5/5   Flexion     4-/5 4+/5        Flexibility: [] Unable to assess at this time  Hip Flexor:  (L) Tightness= [] WNL   [x] Min   [] Mod   [] Severe    (R) Tightness= [] WNL   [] Min   [] Mod   [] Severe  Hamstrings:    (L) Tightness= [] WNL   [x] Min   [] Mod   [] Severe    (R) Tightness= [] WNL   [] Min   [] Mod   [] Severe  Quadriceps:    (L) Tightness= [] WNL   [x] Min   [] Mod   [] Severe    (R) Tightness= [] WNL   [] Min   [] Mod   [] Severe  Gastroc:    (L) Tightness= [] WNL   [] Min   [] Mod   [] Severe    (R) Tightness= [] WNL   [] Min   [] Mod   [] Severe                                  Palpation  [] Min  [] Mod  [] Severe    Location:  [] Min  [] Mod  [] Severe    Location:  [] Min  [] Mod  [] Severe    Location:    Optional Tests  Chacho/ Alessia Test: [] Neg    [] Pos  Romeo Test:  [] Neg    [] Pos  Scouring Test: [] Neg    [] Pos  Trendelenberg:  [] Neg    [] Pos [] Left    [] Right  OberTest:   [] Neg    [] Pos  Ely's Test:  [] Neg    [] Pos  Piriformis Test:  [] Neg    [] Pos  Sub-talor alignment: [] Neurtral [] Pronation [] Supination  Forefoot alignment: [] Neutral [] Varus [] Valgus  Functional Leg Length (cm):   Right:  Left:  Discrepancy:    Other tests/ comments:    Mobility: Independent  Self Care:  Independent      Modality rationale:     Min Type Additional Details    [] Estim:  []Unatt       []IFC  []Premod                        []Other:  []w/ice   []w/heat  Position:  Location: [] Estim: []Att    []TENS instruct  []NMES                    []Other:  []w/US   []w/ice   []w/heat  Position:  Location:         []  Ultrasound: []Continuous   [] Pulsed                           []1MHz   []3MHz Location:  W/cm2:         []  Ice     []  heat  []  Ice massage  []  Laser   []  Anodyne Position:  Location:         []  Vasopneumatic Device Pressure:       [] lo [] med [] hi   Temperature: [] lo [] med [] hi   [] Skin assessment post-treatment:  []intact []redness- no adverse reaction    []redness - adverse reaction:     30 min [x]Eval                  []Re-Eval     15 min Therapeutic Activity:  [x]  See flow sheet :   Rationale: increase ROM and increase strength  to improve the patients ability to ambulate             With   [] TE   [] TA   [] neuro   [] other: Patient Education: [x] Review HEP    [] Progressed/Changed HEP based on:   [] positioning   [] body mechanics   [] transfers   [] heat/ice application    [] other:      Pain Level (0-10 scale) post treatment: 4/10      ASSESSMENT:       [x]  See Plan of Care  []  See progress note/recertification  []  See Discharge Summary           Mami Cerda PT, DPT  3/1/2023  1:07 PM

## 2023-03-02 NOTE — THERAPY EVALUATION
[address] [facility] Reyna Rene, [address] [facility], [address] [place] [address]  PLAN OF CARE / 5 [facility] FOR PHYSICAL THERAPY SERVICES  Patient Name: Joan Maher : 1955   Medical   Diagnosis: No admission diagnoses are documented for this encounter. Treatment Diagnosis: R26.2; M62.81   Onset Date:  23     Referral Source: Shakeel Alfonso, DO Start of Care [facility]): 3/2/2023   Prior Hospitalization: See medical history Provider #: [id number]   Prior Level of Function:  Independent   Comorbidities:  Arthritis, Osteoporosis, SOB   Medications: Verified on Patient Summary List   The Plan of Care and following information is based on the information from the initial evaluation.   ==========================================================================================  Assessment / Functional Analysis:    Pt is a 77 y/o female who presents to physical therapy s/p L LEON on 23. Pt received in waiting area ambulating with 2WW exhibiting an antalgic gait pattern of decreased R step length, decreased gait speed, and decreased jodie. Prior to surgery pt used no AD for ambulation. Pt presents today with decreased ROM, decreased strength, increased pain, and decreased ability to ambulate with normalized gait pattern.  Pt could benefit from skilled PT services to address the above impairments and to improve Pt ability to participate in functional activities of choice.     ==========================================================================================  Eval Complexity: History: MEDIUM  Complexity : 1-2 comorbidities / personal factors will impact the outcome/ POC Exam:LOW Complexity : 1-2 Standardized tests and measures addressing body structure, function, activity limitation and / or participation in recreation  Presentation: LOW Complexity : Stable, uncomplicated  Clinical Decision Making:MEDIUM Complexity : FOTO score of 26-74Overall Complexity:MEDIUM    Problem List: pain affecting function, decrease ROM, decrease strength, impaired gait/ balance, decrease ADL/ functional abilitiies, decrease activity tolerance, decrease flexibility/ joint mobility, and decrease transfer abilities   Treatment Plan may include any combination of the following: Theraputic Exercise, Manual Therapy, Gait and Balance Training, Vasopneumatic Compression Therapeutic activities, and Neuromuscular re-education  Patient / Family readiness to learn indicated by: asking questions, trying to perform skills, and interest  Persons(s) to be included in education: patient (P)  Barriers to Learning/Limitations: No      Patient self reported health status: good  Rehabilitation Potential: good    Objective Measures:  LEFS Score: 18.7  FOTO: 35    Gait:                [] Normal    [] Abnormal    [x] Antalgic    [] NWB    Device: 2WW                          Describe: decreased R step length, decreased gait speed, and decreased jodie                                        ROM/Strength                   AROM                          PROM                       Strength (1-5)  Hip Left Right Left Right Left Right   Flexion 90 105 92   3+/5 4/5   Extension         4/5 4+/5   Abduction         4-/5 4+/5   Adduction         4-/5 4+/5   ER         3+/5 4/5   IR         4/5 4+/5   Knee Left Right Left Right Left Right   Extension         4/5 5/5   Flexion         4-/5 4+/5      Short Term Goals: 3 weeks  Patient will report the knowledge of 3 exercises that can be used to help reduce symptoms to be able to ind reduce symptoms while at home. Patient will demonstrate a 1/2 grade improvement in LLE MMT to be able to better ambulate with a normalized gait without pain. Patient will demonstrate L hip PROM of 0-105 deg to facilitate increased ability to ascend/descend stairs.   Patient will increase LEFS > 10 points to facilitate increased ability to perform functional activities of choice. Long Term Goals: 6 weeks  Patient will demonstrate a 2 grade improvement in LLE MMT to be able to better ambulate with a normalized gait without pain. Patient will demonstrate L hip AROM of 0-105 deg or greater to be able to return to normal ambulation on level and unlevel surfaces. Patient will demonstrate the ability to ambulate > 500 feet without an AD without evidence of antalgia to be able to return to an ind walking program following discharge. Patient will increase LEFS > 20 points to facilitate increased ability to perform leisure activities of choice. Frequency / Duration: Patient to be seen  2  times per week for 6  weeks:  Patient / Caregiver education and instruction: self care, activity modification, and exercises    Therapist Signature: Josie Milner PT, DPT Date: 7/3/8693   Certification Period:  3/1/23 - 6/1/23   Time: 4:40 PM   ===========================================================================================  I certify that the above Physical Therapy Services are being furnished while the patient is under my care. I agree with the treatment plan and certify that this therapy is necessary. Physician Signature:        Date:       Time:     Please sign and return to [facility] PT or you may fax the signed copy to [phone]. Please call [phone] if more information required. Thank you.

## 2023-03-07 ENCOUNTER — HOSPITAL ENCOUNTER (OUTPATIENT)
Age: 68
Setting detail: RECURRING SERIES
Discharge: HOME | End: 2023-03-10
Payer: MEDICARE

## 2023-03-07 PROCEDURE — 97110 THERAPEUTIC EXERCISES: CPT

## 2023-03-07 NOTE — PROGRESS NOTES
PT DAILY TREATMENT NOTE     Patient Name: Yunior Bonilla  Date:3/7/2023  : 1955  [x]  Patient  Verified  Payor: MEDICARE / Plan: MEDICARE PART A AND B / Product Type: *No Product type* /    In time:1443  Out time:1529  Total Treatment Time (min): 48  Total Timed Codes (min): 38  1:1 Treatment Time (min): 38   Visit #: 2     Treatment Area: No admission diagnoses are documented for this encounter.    SUBJECTIVE  Pt reports soreness and pain in L piriformis.      Pain Level (0-10 scale): 2    Any medication changes, allergies to medications, adverse drug reactions, diagnosis change, or new procedure performed?: [x] No    [] Yes (see summary sheet for update)    OBJECTIVE  Modality rationale: decrease edema, decrease inflammation, and decrease pain to improve the patient’s ability to optimally heal.   Min Type Additional Details    [] Estim: []Att   []Unatt  []TENS instruct                 []IFC  []Premod   []NMES                       []Other:  []w/US   []w/ice   []w/heat  Position:  Location:    []  Traction: [] Cervical       []Lumbar                       [] Prone          []Supine                       []Intermittent   []Continuous Lbs:  [] before manual  [] after manual    []  Ultrasound: []Continuous   [] Pulsed                           []1MHz   []3MHz Location:  W/cm2:    []  Iontophoresis with dexamethasone         Location: [] Take home patch   [] In clinic   10 [x]  Ice     []  heat  []  Ice massage Position: seated  Location: L thigh    []  Vasopneumatic Device Pressure: [] lo [] med [] hi   Temp: [] lo [] med [] hi   [] Skin assessment post-treatment:  []intact []redness- no adverse reaction       []redness - adverse reaction:     38 min Therapeutic Exercise:  [x] See flow sheet :   Rationale: increase ROM, increase strength, improve coordination, improve balance, and increase proprioception to improve the patient’s ability to return to PLOF while remaining pain free.      With  TE  TA   NR  GT   [facility] Patient Education: [x] Review HEP    [] Progressed/Changed HEP based on:   [] positioning   [] body mechanics   [] transfers   [] heat/ice application        Pain Level (0-10 scale) post treatment: 2    ASSESSMENT/Changes in Function: Initiated POC working to address strength, range of motion, balance and gait deficits. Session began with an attempt a recumbent bicycle but held due to increase pain in L piriformis. HEP reviewed correcting form and jodie as necessary. Focused on eccentric and concentric control with all movements. CP post rehab to combat soreness. Plan to continue POC progressing as able next visit. Patient will continue to benefit from skilled PT services to modify and progress therapeutic interventions, analyze and address functional mobility deficits, analyze and address ROM deficits, analyze and address strength deficits, analyze and address soft tissue restrictions, analyze and cue for proper movement patterns, analyze and modify for postural abnormalities, and analyze and address imbalance/dizziness to attain remaining goals.      [x]  See Plan of Care  []  See progress note/recertification  []  See Discharge Summary       PLAN  []  Upgrade activities as tolerated     [x]  Continue plan of care  []  Update interventions per flow sheet       []  Discharge due to:_  []  Other:_      DESMOND Kumar  3/7/2023  4:04 PM

## 2023-03-09 ENCOUNTER — HOSPITAL ENCOUNTER (OUTPATIENT)
Age: 68
Setting detail: RECURRING SERIES
Discharge: HOME | End: 2023-03-12
Payer: MEDICARE

## 2023-03-09 PROCEDURE — 97110 THERAPEUTIC EXERCISES: CPT

## 2023-03-09 NOTE — PROGRESS NOTES
PT DAILY TREATMENT NOTE     Patient Name: Edita Contreras  Date:3/9/2023  : 1955  [x]  Patient  Verified  Payor: MEDICARE / Plan: MEDICARE PART A AND B / Product Type: *No Product type* /    In time:1340  Out time:1419  Total Treatment Time (min): 39  Total Timed Codes (min): 29  1:1 Treatment Time (min): 29   Visit #: 3     Treatment Area: No admission diagnoses are documented for this encounter. SUBJECTIVE  Pt reports soreness and pain in L piriformis. Pt ambulated without AD. Pain Level (0-10 scale): 3    Any medication changes, allergies to medications, adverse drug reactions, diagnosis change, or new procedure performed?: [x] No    [] Yes (see summary sheet for update)    OBJECTIVE  Modality rationale: CP post session to decrease pain and edema from exercises. Min Type Additional Details    [] Estim: []Att   []Unatt  []TENS instruct                 []IFC  []Premod   []NMES                       []Other:  []w/US   []w/ice   []w/heat  Position:  Location:    []  Traction: [] Cervical       []Lumbar                       [] Prone          []Supine                       []Intermittent   []Continuous Lbs:  [] before manual  [] after manual    []  Ultrasound: []Continuous   [] Pulsed                           []1MHz   []3MHz Location:  W/cm2:    []  Iontophoresis with dexamethasone         Location: [] Take home patch   [] In clinic   10 [x]  Ice     []  heat  []  Ice massage Position: seated  Location: L thigh    []  Vasopneumatic Device Pressure: [] lo [] med [] hi   Temp: [] lo [] med [] hi   [] Skin assessment post-treatment:  []intact []redness- no adverse reaction       []redness - adverse reaction:     27 min Therapeutic Exercise:  [x] See flow sheet :   Rationale: increase ROM, increase strength, improve coordination, improve balance, and increase proprioception to improve the patients ability to complete household and leisure activities while remaining pain free.       With TE  TA   NR  GT   [facility] Patient Education: [x] Review HEP    [] Progressed/Changed HEP based on:   [] positioning   [] body mechanics   [] transfers   [] heat/ice application        Pain Level (0-10 scale) post treatment: 0    ASSESSMENT/Changes in Function: Continued with POC working to address strength, range of motion, balance and gait deficits. Focused on eccentric and concentric control with all movements. Exercises completed per flowsheet with patient showing improved tolerance and strength. Balance was tested with captain clark with jarvis disk . Ankle strategy observed to maintain balance. No LOB witness during today's session. CP post rehab to combat soreness. Plan to continue POC progressing as able next visit.     Patient will continue to benefit from skilled PT services to modify and progress therapeutic interventions, analyze and address functional mobility deficits, analyze and address ROM deficits, analyze and address strength deficits, analyze and address soft tissue restrictions, analyze and cue for proper movement patterns, analyze and modify for postural abnormalities, and analyze and address imbalance/dizziness to attain remaining goals.     [x]  See Plan of Care  []  See progress note/recertification  []  See Discharge Summary       PLAN  []  Upgrade activities as tolerated     [x]  Continue plan of care  []  Update interventions per flow sheet       []  Discharge due to:_  []  Other:_      DESMOND Kumar  3/9/2023  2:23 PM

## 2023-03-13 ENCOUNTER — HOSPITAL ENCOUNTER (OUTPATIENT)
Age: 68
Setting detail: RECURRING SERIES
Discharge: HOME | End: 2023-03-16
Payer: MEDICARE

## 2023-03-13 PROCEDURE — 97110 THERAPEUTIC EXERCISES: CPT

## 2023-03-13 NOTE — PROGRESS NOTES
Physical Therapy      PT DAILY TREATMENT NOTE     Patient Name: hSelia Dukes  Date:3/13/2023  : 1955  [x]  Patient  Verified  Payor: MEDICARE / Plan: MEDICARE PART A AND B / Product Type: *No Product type* /    In time:  10:50  Out time:11:36  Total Treatment Time (min): 46  Total Timed Codes (min): 36  1:1 Treatment Time (min): 36  Visit #: 4     Treatment Area: No admission diagnoses are documented for this encounter. SUBJECTIVE  Pt reports soreness and pain in L piriformis. Pt ambulated without AD. No other complaints. Pain Level (0-10 scale): 0    Any medication changes, allergies to medications, adverse drug reactions, diagnosis change, or new procedure performed?: [x] No    [] Yes (see summary sheet for update)    OBJECTIVE  Modality rationale: CP post session to decrease pain and edema from exercises.     Min Type Additional Details    [] Estim: []Att   []Unatt  []TENS instruct                 []IFC  []Premod   []NMES                       []Other:  []w/US   []w/ice   []w/heat  Position:  Location:    []  Traction: [] Cervical       []Lumbar                       [] Prone          []Supine                       []Intermittent   []Continuous Lbs:  [] before manual  [] after manual    []  Ultrasound: []Continuous   [] Pulsed                           []1MHz   []3MHz Location:  W/cm2:    []  Iontophoresis with dexamethasone         Location: [] Take home patch   [] In clinic   10 [x]  Ice     []  heat  []  Ice massage Position: seated  Location: L thigh    []  Vasopneumatic Device Pressure: [] lo [] med [] hi   Temp: [] lo [] med [] hi   [] Skin assessment post-treatment:  []intact []redness- no adverse reaction       []redness - adverse reaction:     36 min Therapeutic Exercise:  [x] See flow sheet :   Rationale: increase ROM, increase strength, improve coordination, improve balance, and increase proprioception to improve the patients ability to complete household and leisure activities while remaining pain free. With TE  TA   NR  GT   Misc Patient Education: [x] Review HEP    [] Progressed/Changed HEP based on:   [] positioning   [] body mechanics   [] transfers   [] heat/ice application        Pain Level (0-10 scale) post treatment: 0    ASSESSMENT/Changes in Function: Continued with POC working to address strength, range of motion, balance and gait deficits. Focused on eccentric and concentric control with all movements. Exercises completed per flowsheet with patient showing improved tolerance and strength. Balance was tested with captain clark with jarvis disk . Ankle strategy observed to maintain balance. No LOB witness during today's session. CP post rehab to combat soreness. Continued to hold stepper as per pt request. Pt had a concern of her incision being raised upon arrival.  PTA observed incision, noting slight redness per blood spot and instructed pt on friction massage to reduce scar tissue formation. Plan to inspection incision on upcoming visit. Instructed patient on signs to be aware of for infection. Plan to continue POC progressing as able next visit. Patient will continue to benefit from skilled PT services to modify and progress therapeutic interventions, analyze and address functional mobility deficits, analyze and address ROM deficits, analyze and address strength deficits, analyze and address soft tissue restrictions, analyze and cue for proper movement patterns, analyze and modify for postural abnormalities, and analyze and address imbalance/dizziness to attain remaining goals.      [x]  See Plan of Care  []  See progress note/recertification  []  See Discharge Summary       PLAN  []  Upgrade activities as tolerated     [x]  Continue plan of care  []  Update interventions per flow sheet       []  Discharge due to:_  []  Other:_      DESMOND Delgadillo  3/13/2023  1:41 PM

## 2023-03-17 ENCOUNTER — HOSPITAL ENCOUNTER (OUTPATIENT)
Age: 68
Setting detail: RECURRING SERIES
Discharge: HOME | End: 2023-03-20
Payer: MEDICARE

## 2023-03-17 PROCEDURE — 97110 THERAPEUTIC EXERCISES: CPT

## 2023-03-17 NOTE — PROGRESS NOTES
complete household and leisure activities while remaining pain free. With TE  TA   NR  GT   Misc Patient Education: [x] Review HEP    [] Progressed/Changed HEP based on:   [] positioning   [] body mechanics   [] transfers   [] heat/ice application        Pain Level (0-10 scale) post treatment: 0    ASSESSMENT/Changes in Function: Continued with POC working to address strength, range of motion, balance and gait deficits. Focused on eccentric and concentric control with all movements. Exercises completed per flowsheet with patient showing improved tolerance and strength. Balance was tested with captain clark with jarvis disk . Less ankle strategy observed on L ankle to maintain balance. No LOB witness during today's session. CP post rehab to combat soreness. Continued to hold stepper as per pt request. Observed pts' incision with showing good healing with no signs of infection. Noted scar tissue formation and continued to instruct pt on friction massage in effort to reduce scar tissue formation. Plan to continue POC progressing as able next visit. Patient will continue to benefit from skilled PT services to modify and progress therapeutic interventions, analyze and address functional mobility deficits, analyze and address ROM deficits, analyze and address strength deficits, analyze and address soft tissue restrictions, analyze and cue for proper movement patterns, analyze and modify for postural abnormalities, and analyze and address imbalance/dizziness to attain remaining goals.      [x]  See Plan of Care  []  See progress note/recertification  []  See Discharge Summary       PLAN  []  Upgrade activities as tolerated     [x]  Continue plan of care  []  Update interventions per flow sheet       []  Discharge due to:_  []  Other:_      DESMOND Tejeda  3/17/2023 2:17 PM

## 2023-03-21 ENCOUNTER — APPOINTMENT (OUTPATIENT)
Age: 68
End: 2023-03-21
Payer: MEDICARE

## 2023-03-24 ENCOUNTER — HOSPITAL ENCOUNTER (OUTPATIENT)
Age: 68
Setting detail: RECURRING SERIES
Discharge: HOME | End: 2023-03-27
Payer: MEDICARE

## 2023-03-24 PROCEDURE — 97110 THERAPEUTIC EXERCISES: CPT

## 2023-03-24 NOTE — PROGRESS NOTES
Physical Therapy      PT DAILY TREATMENT NOTE     Patient Name: Cira Lee  Date:3/24/2023  : 1955  [x]  Patient  Verified  Payor: MEDICARE / Plan: MEDICARE PART A AND B / Product Type: *No Product type* /    In time: 1100  Out time: 1155  Total Treatment Time (min): 55  Total Timed Codes (min): 45  1:1 Treatment Time (min): 45  Visit #: 6     Treatment Area: No admission diagnoses are documented for this encounter. SUBJECTIVE  Pt reports  that she had a quick trip to the beach and walked on the beach in the sand and climbed a lot of stairs to [place]. Pain Level (0-10 scale): 0    Any medication changes, allergies to medications, adverse drug reactions, diagnosis change, or new procedure performed?: [x] No    [] Yes (see summary sheet for update)    OBJECTIVE  Modality rationale: CP post session to decrease pain and edema from exercises.     Min Type Additional Details    [] Estim: []Att   []Unatt  []TENS instruct                 []IFC  []Premod   []NMES                       []Other:  []w/US   []w/ice   []w/heat  Position:  Location:    []  Traction: [] Cervical       []Lumbar                       [] Prone          []Supine                       []Intermittent   []Continuous Lbs:  [] before manual  [] after manual    []  Ultrasound: []Continuous   [] Pulsed                           []1MHz   []3MHz Location:  W/cm2:    []  Iontophoresis with dexamethasone         Location: [] Take home patch   [] In clinic   10 [x]  Ice     []  heat  []  Ice massage Position: seated  Location: L thigh/hip    []  Vasopneumatic Device Pressure: [] lo [] med [] hi   Temp: [] lo [] med [] hi   [] Skin assessment post-treatment:  []intact []redness- no adverse reaction       []redness - adverse reaction:     45 min Therapeutic Exercise:  [x] See flow sheet :   Rationale: increase ROM, increase strength, improve coordination, improve balance, and increase proprioception to improve the patients ability

## 2023-03-27 ENCOUNTER — HOSPITAL ENCOUNTER (OUTPATIENT)
Age: 68
Setting detail: RECURRING SERIES
Discharge: HOME | End: 2023-03-30
Payer: MEDICARE

## 2023-03-27 PROCEDURE — 97110 THERAPEUTIC EXERCISES: CPT

## 2023-03-27 NOTE — PROGRESS NOTES
leisure activities while remaining pain free. With TE  TA   NR  GT   Misc Patient Education: [x] Review HEP    [] Progressed/Changed HEP based on:   [] positioning   [] body mechanics   [] transfers   [] heat/ice application        Pain Level (0-10 scale) post treatment: 0    ASSESSMENT/Changes in Function: Continued with POC working to address strength, range of motion, balance and gait deficits. Focused on eccentric and concentric control with all movements. Exercises completed per flowsheet with patient showing improved tolerance and strength. Balance was tested with captain clark with jarvis disk . Less ankle strategy observed on L ankle to maintain balance. Introduced quad set, SLR (supine), hip abduction (side lying), standing heel raises, SLS, low neo step overs, lateral step ups on 6 inch step and fitter board all documented on flow sheet. Updated HEP issued updated HEP. CP to L hip post rehab to combat soreness. Continued to hold stepper as per pt request. Observed pts' incision with showing good healing with no signs of infection. Noted scar tissue formation and continued to instruct pt on friction massage in effort to reduce scar tissue formation. No eyes closed activity as it evokes her vertigo. Plan to continue POC progressing as able next visit pt has requested to be Dc'ed on her upcoming reassessment as she feels that she is doing well. Patient will continue to benefit from skilled PT services to modify and progress therapeutic interventions, analyze and address functional mobility deficits, analyze and address ROM deficits, analyze and address strength deficits, analyze and address soft tissue restrictions, analyze and cue for proper movement patterns, analyze and modify for postural abnormalities, and analyze and address imbalance/dizziness to attain remaining goals.      [x]  See Plan of Care  []  See progress note/recertification  []  See Discharge Summary       PLAN  []  Upgrade

## 2023-03-31 ENCOUNTER — HOSPITAL ENCOUNTER (OUTPATIENT)
Age: 68
Setting detail: RECURRING SERIES
End: 2023-03-31
Payer: MEDICARE

## 2023-03-31 PROCEDURE — 97110 THERAPEUTIC EXERCISES: CPT

## 2023-03-31 NOTE — PROGRESS NOTES
Physical Therapy      PT DAILY TREATMENT NOTE     Patient Name: Moi Lares  Date:3/31/2023  : 1955  [x]  Patient  Verified  Payor: MEDICARE / Plan: MEDICARE PART A AND B / Product Type: *No Product type* /    In time: 1000  Out time: 1050  Total Treatment Time (min): 50  Total Timed Codes (min): 50  1:1 Treatment Time (min): 50  Visit #: 8    Treatment Area: No admission diagnoses are documented for this encounter. SUBJECTIVE  Pt reports that she is doing well and has no pain. \"I really feel that I can be discharged today. \"  Pain Level (0-10 scale): 0    Any medication changes, allergies to medications, adverse drug reactions, diagnosis change, or new procedure performed?: [x] No    [] Yes (see summary sheet for update)    OBJECTIVE  Modality rationale: Declined modalities   Min Type Additional Details    [] Estim: []Att   []Unatt  []TENS instruct                 []IFC  []Premod   []NMES                       []Other:  []w/US   []w/ice   []w/heat  Position:  Location:    []  Traction: [] Cervical       []Lumbar                       [] Prone          []Supine                       []Intermittent   []Continuous Lbs:  [] before manual  [] after manual    []  Ultrasound: []Continuous   [] Pulsed                           []1MHz   []3MHz Location:  W/cm2:    []  Iontophoresis with dexamethasone         Location: [] Take home patch   [] In clinic    []  Ice     []  heat  []  Ice massage Position: seated  Location: L thigh/hip    []  Vasopneumatic Device Pressure: [] lo [] med [] hi   Temp: [] lo [] med [] hi   [] Skin assessment post-treatment:  []intact []redness- no adverse reaction       []redness - adverse reaction:     50 min Therapeutic Exercise:  [x] See flow sheet :   Rationale: increase ROM, increase strength, improve coordination, improve balance, and increase proprioception to improve the patients ability to complete household and leisure activities while remaining pain free. With TE  TA   NR  GT   [facility] Patient Education: [x] Review HEP    [] Progressed/Changed HEP based on:   [] positioning   [] body mechanics   [] transfers   [] heat/ice application        Pain Level (0-10 scale) post treatment: 0    ASSESSMENT/Changes in Function: Continued with POC working to address strength, range of motion, balance and gait deficits. Focused on eccentric and concentric control with all movements. Exercises completed per flowsheet with patient showing improved tolerance and strength. Balance was tested with captain clark with jarvis disk . Less ankle strategy observed on L ankle to maintain balance. Continued quad set, standing heel raises, SLS, low neo step overs, lateral step ups on 6 inch step and fitter board all documented on flow sheet. Evaluating therapist saw pt this date to assess for DC and agreed to DC this date. Patient will continue to benefit from skilled PT services to modify and progress therapeutic interventions, analyze and address functional mobility deficits, analyze and address ROM deficits, analyze and address strength deficits, analyze and address soft tissue restrictions, analyze and cue for proper movement patterns, analyze and modify for postural abnormalities, and analyze and address imbalance/dizziness to attain remaining goals.      [x]  See Plan of Care  []  See progress note/recertification  [x]  See Discharge Summary       PLAN  []  Upgrade activities as tolerated     [x]  Continue plan of care  []  Update interventions per flow sheet       []  Discharge due to:_  []  Other:_      DESMOND Hopson  3/31/2023 12:23 PM

## 2024-03-26 ENCOUNTER — HOSPITAL ENCOUNTER (EMERGENCY)
Age: 69
Discharge: HOME | End: 2024-03-26
Attending: EMERGENCY MEDICINE
Payer: MEDICARE

## 2024-03-26 VITALS
HEART RATE: 85 BPM | SYSTOLIC BLOOD PRESSURE: 121 MMHG | HEIGHT: 70 IN | DIASTOLIC BLOOD PRESSURE: 59 MMHG | WEIGHT: 190 LBS | OXYGEN SATURATION: 96 % | BODY MASS INDEX: 27.2 KG/M2 | TEMPERATURE: 98.4 F | RESPIRATION RATE: 16 BRPM

## 2024-03-26 DIAGNOSIS — R51.9 NONINTRACTABLE HEADACHE, UNSPECIFIED CHRONICITY PATTERN, UNSPECIFIED HEADACHE TYPE: Primary | ICD-10-CM

## 2024-03-26 DIAGNOSIS — J06.9 VIRAL URI WITH COUGH: ICD-10-CM

## 2024-03-26 LAB
FLUAV AG NPH QL IA: NEGATIVE
FLUBV AG NOSE QL IA: NEGATIVE

## 2024-03-26 PROCEDURE — 99283 EMERGENCY DEPT VISIT LOW MDM: CPT

## 2024-03-26 PROCEDURE — 6370000000 HC RX 637 (ALT 250 FOR IP): Performed by: EMERGENCY MEDICINE

## 2024-03-26 PROCEDURE — 87804 INFLUENZA ASSAY W/OPTIC: CPT

## 2024-03-26 RX ORDER — LANOLIN ALCOHOL/MO/W.PET/CERES
400 CREAM (GRAM) TOPICAL DAILY
COMMUNITY

## 2024-03-26 RX ORDER — BENZONATATE 100 MG/1
100 CAPSULE ORAL
Status: COMPLETED | OUTPATIENT
Start: 2024-03-26 | End: 2024-03-26

## 2024-03-26 RX ORDER — BENZONATATE 100 MG/1
100 CAPSULE ORAL 3 TIMES DAILY PRN
Qty: 30 CAPSULE | Refills: 0 | Status: SHIPPED | OUTPATIENT
Start: 2024-03-26 | End: 2024-04-05

## 2024-03-26 RX ORDER — IBUPROFEN 600 MG/1
600 TABLET ORAL
Status: COMPLETED | OUTPATIENT
Start: 2024-03-26 | End: 2024-03-26

## 2024-03-26 RX ADMIN — IBUPROFEN 600 MG: 600 TABLET, FILM COATED ORAL at 02:11

## 2024-03-26 RX ADMIN — BENZONATATE 100 MG: 100 CAPSULE ORAL at 02:11

## 2024-03-26 ASSESSMENT — PAIN DESCRIPTION - LOCATION: LOCATION: HEAD

## 2024-03-26 ASSESSMENT — PAIN - FUNCTIONAL ASSESSMENT: PAIN_FUNCTIONAL_ASSESSMENT: 0-10

## 2024-03-26 ASSESSMENT — PAIN DESCRIPTION - DESCRIPTORS: DESCRIPTORS: ACHING

## 2024-03-26 ASSESSMENT — PAIN SCALES - GENERAL: PAINLEVEL_OUTOF10: 8

## 2024-03-26 NOTE — DISCHARGE INSTRUCTIONS
You are seen and evaluated for your headache and cough.  Your flu test came back negative here in the emergency room.  I feel like this is another viral process.  You are given Tessalon Perles and ibuprofen here in the emergency room.  You are given a prescription for the Tessalon Perles to help with cough suppression.  Continue taking Tylenol and ibuprofen.  Symptomatic relief with honey and hot tea as we discussed.  Follow-up with your primary care doctor.  Return the emergency room for worsening symptoms or any other concerns.

## 2024-03-26 NOTE — ED PROVIDER NOTES
dictations but occasionally words are mis-transcribed.)    Trevor De La Rosa DO (electronically signed)    I, Trevor De La Rosa DO, am the primary clinician of record.    Dragon Disclaimer     Please note that this dictation was completed with HipChat, the computer voice recognition software.  Quite often unanticipated grammatical, syntax, homophones, and other interpretive errors are inadvertently transcribed by the computer software.  Please disregard these errors.  Please excuse any errors that have escaped final proofreading.    Trevor De La Rosa DO  (Electronically signed)           Trevor De La Rosa DO  03/26/24 0428